# Patient Record
Sex: MALE | Race: WHITE | URBAN - METROPOLITAN AREA
[De-identification: names, ages, dates, MRNs, and addresses within clinical notes are randomized per-mention and may not be internally consistent; named-entity substitution may affect disease eponyms.]

---

## 2018-10-01 ENCOUNTER — OFFICE VISIT (OUTPATIENT)
Dept: VASCULAR SURGERY | Facility: CLINIC | Age: 53
End: 2018-10-01
Payer: COMMERCIAL

## 2018-10-01 DIAGNOSIS — Z53.9 ERRONEOUS ENCOUNTER--DISREGARD: Primary | ICD-10-CM

## 2018-10-01 PROCEDURE — 99243 OFF/OP CNSLTJ NEW/EST LOW 30: CPT | Performed by: SURGERY

## 2018-10-01 NOTE — PROGRESS NOTES
[facility]: Natasha Green comes to see me today per recommendation of Dr. Deo Washington for evaluation of his right leg varicose vein problems.  This 53-year-old  started having vein problems at the age of 20.  These were much more prominent on his right and left leg.  Had no history of DVT in the past nor superficial thrombophlebitis.    He underwent some sort of vein surgery approximately 20 years ago on the right leg and again approximately 10 years ago at [facility] in the [facility].  He is unsure whether the greater or lesser saphenous veins were treated.    He has had several episodes of ulceration to the right medial ankle.  This oftentimes is associated with minimal trauma.  Last episode was several months ago where he caught this early and thus healed relatively quickly.  He is worn compression stockings for years but for the last 2 years does it on a daily basis.  This helps decrease the swelling and discomfort he has in his ankle.    Patient is concerned about the area of previous ulceration and the fact that it is more sore despite the use of his compression and comes for a venous vascular evaluation.    PMH: No allergies.            Medications: Allopurinol daily            Medical: Venous insufficiency in the right leg with history of ulceration                           Several episodes of gout in both of his feet and toes.  No episodes                                Since starting allopurinol.                            History of diabetes-hypertension-PAD.              Non-smoker.  2-4 glasses of wine weekly.            Primarily sitting at work 10 hours daily.      FMH: Both his father and brother had venous problems with ulcerations.  Brother required surgery and several episodes of prolonged hospitalization in many months for healing.  No history of coagulopathy in the family.      Exam: Very pleasant alert gentleman.  Height 6 foot 1 inches.   Weight 235 pound              HEENT: Normal.  Glasses.              Chest= clear.   Cardiovascular= regular rate.              Extremities= +3 dorsalis pedis and posterior tibial pulses bilaterally.                 Asymptomatic left leg with varicose vein in the proximal to distal calf                    Measuring 3-4 mm in diameter.  Normal overlying skin and sensation with no pre-ulcerative changes.                   Right leg with a large tortuous varicose vein up to 4 mm in diameter in the proximal medial thigh extending down to the mid medial calf.  In the medial ankle there is evidence of chronic skin induration with hemosiderin changes and an area measuring approximately 4 x 4 cm.  Healed ulcer is noted in the center of this.  No obvious varicose veins immediately adjacent to this.        Impression: Significant abnormality of right leg venous system with only mild changes in the left.  On the right he has had prior surgery twice but unsure whether the saphenous system has been treated.  He has had ulcerations and very likely is an incompetent  at this site.  Despite the chronic use daily of knee-high graduated compression stockings he still having issues and is at high risk for recurrent venous ulceration.  Because of this I would recommend aggressive treatment.  We will perform a right leg venous duplex ultrasound of the superficial and deep venous systems and also perforators to help determine what treatment would be best for him.                     No matter what treatment we performed its very likely he will need a knee-high graduated compression stocking lifelong he is very understanding of this.                    Though he has varicose veins in the left calf these are asymptomatic with no stigmata of complications and thus no ultrasound will be performed at this time.       Brayan Boogie MD   Dictated 10/1/2018

## 2018-10-01 NOTE — MR AVS SNAPSHOT
"              After Visit Summary   10/1/2018    Dru Green    MRN: [id number]           Patient Information     Date Of Birth          1965        Visit Information        Provider Department      10/1/2018 4:15 PM Brayan Boogie MD [facility]      Today's Diagnoses     ERRONEOUS ENCOUNTER--DISREGARD    -  1       Follow-ups after your visit        Who to contact     If you have questions or need follow up information about today's clinic visit or your schedule please contact [facility] directly at [phone].  Normal or non-critical lab and imaging results will be communicated to you by [employer], letter or phone within 4 business days after the clinic has received the results. If you do not hear from us within 7 days, please contact the clinic through [employer] or phone. If you have a critical or abnormal lab result, we will notify you by phone as soon as possible.  Submit refill requests through "[employer]" or call your pharmacy and they will forward the refill request to us. Please allow 3 business days for your refill to be completed.          Additional Information About Your Visit        MyChart Information     "[employer] lets you send messages to your doctor, view your test results, renew your prescriptions, schedule appointments and more. To sign up, go to [email or web address]" . Click on \"Log in\" on the left side of the screen, which will take you to the Welcome page. Then click on \"Sign up Now\" on the right side of the page.     You will be asked to enter the access code listed below, as well as some personal information. Please follow the directions to create your username and password.     Your access code is: [email or web address]  Expires: 2/3/2019  2:31 PM     Your access code will  in 90 days. If you need help or a new code, please call your [place] clinic or [phone].        Care EveryWhere ID     This " is your Care EveryWhere ID. This could be used by other organizations to access your [place] medical records  [id number]         Blood Pressure from Last 3 Encounters:   No data found for BP    Weight from Last 3 Encounters:   No data found for Wt              Today, you had the following     No orders found for display       Primary Care Provider Office Phone # Fax #    Deo Washington [phone]       [facility] [address]  [facility] [address]        Equal Access to Services     [facility] : Hadii aad ku hadasho Soomaali, waaxda luqadaha, qaybta kaalmada adeegyada, waxay idiin hayaan adeeg ivettearaisidoro lakalyn . So [facility] [phone].    ATENCIÓN: Si habla español, tiene a connor disposición servicios gratuitos de asistencia lingüística. [facility] [phone].    We comply with applicable federal civil rights laws and [place] laws. We do not discriminate on the basis of race, color, national origin, age, disability, sex, sexual orientation, or gender identity.            Thank you!     Thank you for choosing [facility]  for your care. Our goal is always to provide you with excellent care. Hearing back from our patients is one way we can continue to improve our services. Please take a few minutes to complete the written survey that you may receive in the mail after your visit with us. Thank you!             Your Updated Medication List - Protect others around you: Learn how to safely use, store and throw away your medicines at www.disposemymeds.org.      Notice  As of 10/1/2018 11:59 PM    You have not been prescribed any medications.

## 2018-10-29 ENCOUNTER — APPOINTMENT (OUTPATIENT)
Dept: VASCULAR SURGERY | Facility: CLINIC | Age: 53
End: 2018-10-29
Payer: COMMERCIAL

## 2018-10-29 ENCOUNTER — OFFICE VISIT (OUTPATIENT)
Dept: VASCULAR SURGERY | Facility: CLINIC | Age: 53
End: 2018-10-29
Payer: COMMERCIAL

## 2018-10-29 DIAGNOSIS — Z53.9 ERRONEOUS ENCOUNTER--DISREGARD: Primary | ICD-10-CM

## 2018-10-29 PROCEDURE — 93971 EXTREMITY STUDY: CPT | Performed by: SURGERY

## 2018-10-29 PROCEDURE — 99213 OFFICE O/P EST LOW 20 MIN: CPT | Performed by: SURGERY

## 2018-10-29 NOTE — MR AVS SNAPSHOT
After Visit Summary   10/29/2018    Dru Green    MRN: [id number]           Patient Information     Date Of Birth          1965        Visit Information        Provider Department      10/29/2018 2:15 PM Brayan Boogie MD [facility]      Today's Diagnoses     ERRONEOUS ENCOUNTER--DISREGARD    -  1       Follow-ups after your visit        Your next 10 appointments already scheduled     Dec 07, 2018 10:30 AM CST   Phlebectomy with Brayan Boogie MD,  VEIN PROCEDURE ROOM 2   [facility]    [address]  [facility] [address]   [phone]            Dec 07, 2018 11:30 AM CST   VNUS Closure with Brayan Boogie MD,  VEIN PROCEDURE ROOM 2   [facility]    [address]  [facility] [address]   [phone]            Dec 10, 2018  3:00 PM CST   VNUS Ultrasound 72 Hour with  Vein Vascular Lab   [facility]    [address]  [facility] [address]   [phone]            Dec 10, 2018  3:30 PM CST   Rewrap 48 Hour with  Vein Nurse   [facility]    [address]  [facility] [address]   [phone]              Who to contact     If you have questions or need follow up information about today's clinic visit or your schedule please contact [facility] directly at [phone].  Normal or non-critical lab and imaging results will be communicated to you by MyChart, letter or phone within 4 business days after the clinic has received the results. If you do not hear from us within 7 days, please contact the clinic through MyChart or phone. If you have a critical or abnormal lab result, we will  "notify you by phone as soon as possible.  Submit refill requests through [employer] or call your pharmacy and they will forward the refill request to us. Please allow 3 business days for your refill to be completed.          Additional Information About Your Visit        [employer] Information     [employer] lets you send messages to your doctor, view your test results, renew your prescriptions, schedule appointments and more. To sign up, go to [email or web address] . Click on \"Log in\" on the left side of the screen, which will take you to the Welcome page. Then click on \"Sign up Now\" on the right side of the page.     You will be asked to enter the access code listed below, as well as some personal information. Please follow the directions to create your username and password.     Your access code is: [email or web address]  Expires: 2/3/2019  2:31 PM     Your access code will  in 90 days. If you need help or a new code, please call your [place] clinic or [phone].        Care EveryWhere ID     This is your Care EveryWhere ID. This could be used by other organizations to access your [place] medical records  [id number]         Blood Pressure from Last 3 Encounters:   No data found for BP    Weight from Last 3 Encounters:   No data found for Wt              Today, you had the following     No orders found for display       Primary Care Provider Office Phone # Fax #    Deo Washington [phone]       [facility] [address]  [facility] [address]        Equal Access to Services     [facility] CenterEMMANUELLE : Hadii aad ku hadasho Soomaali, waaxda luqadaha, qaybta kaalmada adeegyada, waxay raffi tran . So [facility] [phone].    ATENCIÓN: Si habla español, tiene a connor disposición servicios gratuitos de asistencia lingüística. Llame al [phone].    We comply with applicable federal civil rights laws and [place] laws. We do not discriminate on the basis of race, color, " national origin, age, disability, sex, sexual orientation, or gender identity.            Thank you!     Thank you for choosing [facility]  for your care. Our goal is always to provide you with excellent care. Hearing back from our patients is one way we can continue to improve our services. Please take a few minutes to complete the written survey that you may receive in the mail after your visit with us. Thank you!             Your Updated Medication List - Protect others around you: Learn how to safely use, store and throw away your medicines at www.disposemymeds.org.      Notice  As of 10/29/2018 11:59 PM    You have not been prescribed any medications.

## 2018-10-29 NOTE — PROGRESS NOTES
[facility]: Natasha Green returns today for a right leg venous duplex ultrasound.  This 53-year-old patient has had chronic pain problems in his right leg.  He has had a history of ulcerations in the right medial ankle region after minimal trauma that healed.  He has ongoing stasis dermatitis and hemosiderin changes.  He has markedly dilated varicose veins throughout the thigh and calf region.  He wears chronically a knee-high graduated compression stockings and has done so for a minimum of 2 years if not longer.  Had no problems in his left leg.      Exam: Unchanged from previously documented exam.             Stasis and induration but no ulcer over the proximal right medial ankle.              Dilated tortuous large varicose veins with thigh region and smaller distally.  I do not see any veins immediately adjacent to the ankle region nor coming off the lesser saphenous vein.      Viewed the duplex ultrasound.  As expected the deep system is incompetent in the common femoral vein to the popliteal veins.  Tibial veins appear to be competent.  Greater saphenous vein competent from the saphenofemoral junction down to the mid calf giving off the varicose vein branches in the thigh-knee-calf region.  This measures up to 8.9 mm in the thigh and 7.4 mm in the calf.  Proximal lesser saphenous vein is also incompetent to the mid calf measuring 3.7 mm and giving off a 4.5 mm varicosity that goes towards the healed ulcer site.  There are 2 incompetent perforators important being or centimeters above the ankle and thus close to the ulcerated area measuring 4.5 mm in diameter.  This feeds in the greater saphenous system.  The second 1 is larger at 6.6 mm but it is 18 cm above the ulcerated area and also feeds in the greater saphenous system.      Impression: Significant incompetent right thigh and calf varicose veins with incompetent the greater saphenous system and lesser saphenous system feeding into these  varicosities.  History of healed ulcer with chronic stasis changes deep venous incompetence and also incompetent .  I do feel that surgical treatment is indicated to decrease his risk of further problems developing with recurrent ulcerations.  We cannot correct the deep system but we certainly can affect the incompetent fascial system.  I would recommend we perform medically necessary closure of the entire greater saphenous system and at least the proximal two thirds of the lesser saphenous system.  He is aware he may have some temporary permanent numbness of the greater saphenous system (artery has some mild changes due to the previous ulceration) and sural nerve distribution.  Medically necessary stab phlebectomies would also be performed to decrease the reflux and pressure upon the lower leg.  Hoping by closure of the distal greater saphenous vein will also indirectly close the perforators.  On his follow-up 72-hour duplex we did specifically look at these perforators and if they are not closed would consider at a later time a specific  closure and have discussed this with him.    Discussed the surgical procedure which was performed in our office with a light oral sedation and a tumescent anesthetic along with her post compression protocol and duplex follow-up and return to work.  He will be measured for thigh-high compression stockings to use postprocedure.  Very likely with his deep venous insufficiency he would wear particular at work a knee-high compression stocking and indefinitely to help prevent complications of venous hypertension.  Spent 30 minutes in the office today with over 50% in counseling.    Brayan Boogie MD     CC Dr Deo Washington      Dictated 10/29/2018

## 2018-12-07 ENCOUNTER — APPOINTMENT (OUTPATIENT)
Dept: VASCULAR SURGERY | Facility: CLINIC | Age: 53
End: 2018-12-07
Payer: COMMERCIAL

## 2018-12-07 ENCOUNTER — OFFICE VISIT (OUTPATIENT)
Dept: VASCULAR SURGERY | Facility: CLINIC | Age: 53
End: 2018-12-07
Payer: COMMERCIAL

## 2018-12-07 DIAGNOSIS — Z53.9 ERRONEOUS ENCOUNTER--DISREGARD: Primary | ICD-10-CM

## 2018-12-07 PROCEDURE — 36471 NJX SCLRSNT MLT INCMPTNT VN: CPT | Performed by: SURGERY

## 2018-12-07 PROCEDURE — 36475 ENDOVENOUS RF 1ST VEIN: CPT | Mod: RT | Performed by: SURGERY

## 2018-12-07 PROCEDURE — 36476 ENDOVENOUS RF VEIN ADD-ON: CPT | Mod: RT | Performed by: SURGERY

## 2018-12-07 NOTE — PROGRESS NOTES
[facility] Operative Report    Preoperative diagnosis:    1.  Incompetent right greater and lesser saphenous veins with prior right medial calf ulcer      Post operative diagnosis:  Same    Procedure:  1.  Radiofrequency ablation right greater saphenous vein (groin-entire calf)  2.  Radiofrequency ablation right lesser saphenous vein.  3.  Medically necessary stab phlebectomies right thigh-calf varicose veins    Preoperative medications: Ativan 3 mg ativan, 0.1 mg clonidine    Surgeon  Brayan Boogie MD    First assistant  Yesenia Miranda CST/CSFA    Operative description  Indications: 53-year-old patient has had a history of deep venous incompetence and recurrent episodes of ulceration in the right medial ankle and calf due to venous insufficiency.  He has presently healed but concerned about recurrence.  He had been wearing compression and his last ulcer was very difficult to heal.  He has incompetence of the greater saphenous system which is quite dilated from the saphenofemoral junction down to the distal calf just before the previous ulcerated area.  Multiple large varicose branches are noted.  Also incompetence of the lesser saphenous system with these findings we felt that closure of the superficial system and stab phlebectomies are medically necessary with the help prevent recurrent ulcerations.  Risk benefits discussed include some potential numbness which she does have some already from his prior ulcers in the ankle region.  With him standing the thigh and calf varicosities were marked.    Procedure: He was brought to the procedure room.  Entire right groin and leg and foot were prepped and draped.  Timeout was called and sites were identified.    VNUS: He was placed in reverse Trendelenburg.  We followed the greater saphenous vein.  However, different from our preoperative evaluation this was quite dilated in the groin going into large surface varicosities.  The main  greater saphenous vein was not identified from the proximal groin to the knee level.  We then identified the greater saphenous vein with multiple branches at the level of the ankle though it was quite tortuous in the mid calf.  We anesthetized the ankle.  Under ultrasound guidance a needle was introduced into the greater saphenous vein distal to the prior ulcer.  This is followed by a guidewire and a 7 Thai sheath.  Our closure fast cath was selected and flushed.  This was passed up the sheath but as expected due to the tortuosity in the mid calf going no further.  We reaccessed the greater saphenous vein in the mid calf just beyond this point and a guidewire was left off.    Under ultrasound guidance tumescent solution was injected from the ankle to midcalf around the sheath and catheter.  Patient was placed in Trendelenburg and we began our treatments.  The first 2 segments were treated with 220 sec. treatments at 120  C with good visualization effect by ultrasound pressure.    Sheath and cath were removed and we placed him again in reverse Trendelenburg.  Sheath was placed over the mid calf guidewire.  Closure fast cath was flushed and passed through the sheath but as not unexpected would not go much beyond the knee level into the distal thigh due to very small vein and multiple branches.  Placed again in reverse Trendelenburg and we anesthetized the segment.  We treated this for a length of 14 cm with 2 treatment sessions both of these being a double treatment with good impedance being noted and visualization effect and no discomfort.    Placed again in reverse Trendelenburg.  We looked again at the groin area.  We had a length of approximately 7 cm before the greater saphenous vein went into the large side branch as it was very small beyond this point.  Under ultrasound guidance we entered the greater saphenous vein at the point where the large branches developed.  This was followed by a guidewire and sheath.   Catheter was placed through the sheath.  This was positioned 1.65 cm from the junction and a tumescent solution was injected.  We document the position pulling this slightly further back to closer to 1.7 cm up.  We treated this short segment with 2 treatment sessions with pressure held over the area and the patient in Trendelenburg ensuring there was no heat or propagation clot beyond the saphenofemoral junction.  Sheath and catheter removed we documented confirmed patency of the common femoral vein at the saphenofemoral junction.        Stab phlebectomy: We then anesthetized the marked sites in the thigh-calf and also distal calf circumferentially around the ulcerated area.  Micro-ophthalmic blade was used to make stab incisions.  With the aid of the vein hook we remove these varicosities in their entirety.  The marks anterior to the ulcerated had large veins were removed though the posterior marks were very small.  I placed the patient in reverse Trendelenburg one more time and looked with the ultrasound around the ankle ulcer site did not identify any large veins it could be treated further some of these may have been affected by her tumescent solution.      VNUS:   Patient was then placed prone in reverse Trendelenburg.  Ultrasound was used to identify the lesser saphenous vein from the saphenofemoral popliteal junction down to the ankle.  We anesthetized the ankle and introduced our needle and guidewire and sheath with no difficulty.  Closure fast catheter was used having a new catheter and pass of the sheath towards the knee level up with no difficulty.  We withdrew the catheter 2.43 cm from the junction and secured in position.  Placed in Trendelenburg with the position was confirmed and documented.  Tumescent solution was injected from the ankle to the knee under ultrasound guidance.  We began her treatment sessions.  The first two 7 cm segments were treated with a double session for 20 seconds and 120  C  with pressure held of the area in good visualization effect.  The final segments were treated with a single session with good impedance being noted.  Sheath and catheter removed pressure was held.    Several popliteal varicosities were removed via the stab incisions at this point.    Patient was placed supine.  Vaseline ointment-ABD pads cast cast rolls gas Ace bandage from the toes to the groin were applied    Sheila MARTIN CMA monitored blood pressure, pulse and pulse oximetry continuously throughout the procedure under my direct supervision.Phlebectomy Procedure:    Pharmacologic agents:   Local anesthesia:   10 mL Lidocaine 1% with Epinephrine + 1 mL Sodium Bicarbonate 8.4%    Total injected volume: 8 ml    Tumescent Anesthesia: 500 ml bag 0.9% Sodium Chloride +60 ml 1% lidocaine with epi 1:100,000 +12 ml 8.4% Sodium Bicarbonate (total volume: 572ml per bag)  Total injected volume: 522 mL    Use of ultrasound guidance:  Yes  Location of varicosities: Thigh-calf-ankle  Number of incisions: 39 stepladder incisions        Use of ultrasound guidance:  Yes    Catheter insertion site: 2 sites in the right medial calf and one in the right groin.                                       Right distal posterior lateral calf/ankle  Length of vein treated: Greater saphenous vein and 3 segments with the groin being 7 cm, proximal calf 12 cm, distal calf 15.5 cm.  Lesser saphenous vein treated for a length of 38 cm.  Treatment with 10 RF cycles for 3 minutes and 20 seconds minutes and 20 seconds for the greater saphenous vein.    7 RF cycles for a time of 2 minutes and 20 seconds for the lesser saphenous vein.      Patient taught procedure well.  Discharged home with his father with postoperative instructions and follow-up.      Brayan Boogie MD   Dictated 12/7/2018

## 2018-12-10 ENCOUNTER — APPOINTMENT (OUTPATIENT)
Dept: VASCULAR SURGERY | Facility: CLINIC | Age: 53
End: 2018-12-10
Payer: COMMERCIAL

## 2018-12-10 PROCEDURE — 99207 ZZC VEINSOLUTIONS NO CHARGE VISIT: CPT | Performed by: SURGERY

## 2018-12-10 PROCEDURE — 93971 EXTREMITY STUDY: CPT | Performed by: SURGERY

## 2018-12-11 NOTE — PROGRESS NOTES
[facility]: Natasha    I called Dru Green about his vein surgery we performed on 12/7/2018.  He is done well following the surgery.  No numbness in the ankle greater saphenous nerve distribution.    He was in the office earlier today where duplex confirmed closure of the greater saphenous vein and lesser saphenous vein.  Reports that he is been very comfortable since surgery and has no concerns.      He will continue with his compression protocol also long-term knee-high graduated compression stockings due to his history of ulceration and deep venous insufficiency.  He will see me again in the office in 6 weeks.      Brayan Boogie MD   12/10/2018

## 2019-01-14 ENCOUNTER — OFFICE VISIT (OUTPATIENT)
Dept: VASCULAR SURGERY | Facility: CLINIC | Age: 54
End: 2019-01-14
Payer: COMMERCIAL

## 2019-01-14 DIAGNOSIS — Z53.9 ERRONEOUS ENCOUNTER--DISREGARD: Primary | ICD-10-CM

## 2019-01-14 PROCEDURE — 99207 ZZC VEINSOLUTIONS POST OPERATIVE VISIT: CPT | Performed by: SURGERY

## 2019-01-14 NOTE — PROGRESS NOTES
[facility]: Natasha Green has a history of a right medial calf venous insufficiency ulcer.  He is found to have incompetence of the right greater and lesser saphenous veins.  He underwent radiofrequency ablation of the greater saphenous vein from the saphenofemoral junction to the distal calf ablation of the incompetent lesser saphenous vein.  Medically necessary stab phlebectomies in the thigh and calf were also performed on 12/7/2018.    Ulcers remain healed.  He does have some chronic skin discoloration at the site of the previous ulcer but the adjacent skin is soft and supple with no swelling at all.  He does wear his knee-high [employer] type compression stockings at all times.      He did notice some discomfort in the distal medial calf and ankle region.  This likely was a temporary injury to his greater saphenous nerve that we had discussed.  He actually notices today it is completely gone.    Exam: Alert and appropriate.             Excellent distal pulses.              Several phlebitic cords and the sites were we remove the very large varicose veins particular in the thigh region and this are resolving with no discomfort at all the patient.            No distal swelling.  Normal sensation.            Brownish discoloration over the distal medial tibial region where he had the prior ulcer but the overlying skin is otherwise very healthy.    I reviewed the follow-up 72-hour duplex ultrasound revealing closure of the greater saphenous and lesser saphenous veins.  No obvious perforators were still patent after closing the greater saphenous vein.      Impression: #1.  Doing well following ablation of the greater and lesser saphenous vein.  Resolving phlebitic cords due to the medically necessary stab phlebectomies.  Is having no discomfort with this.  Some injury to the greater saphenous nerve but not the sural nerve and this actually has almost completely resolved.  We discussed this.                         #2.  Patient does have deep venous incompetence on the right leg down through the popliteal vein.  Thus chronic use of a knee-high graduated compression stocking at 2030 mils of mercury is necessary and we discussed this and he understands.  He actually feels much better when he is wearing his stockings.  We also discussed the possibility of using the [employer] brand athletic stockings when doing activities in the summer months.           Duplex in 1 year with particular attention to the sites of the incompetent perforators in the medial calf to make sure they have remained closed.  He will call if he has any concerns or recurrent ulcerations since he is at obvious higher risk for this but with compliance of his compression and use of good lotion prevent dry skin this should be relatively minimal.       Brayan Boogie MD   Dictated 1/14/2019